# Patient Record
Sex: MALE | Race: WHITE | NOT HISPANIC OR LATINO | Employment: OTHER | URBAN - NONMETROPOLITAN AREA
[De-identification: names, ages, dates, MRNs, and addresses within clinical notes are randomized per-mention and may not be internally consistent; named-entity substitution may affect disease eponyms.]

---

## 2017-03-22 ENCOUNTER — OFFICE VISIT (OUTPATIENT)
Dept: OTOLARYNGOLOGY | Facility: CLINIC | Age: 27
End: 2017-03-22

## 2017-03-22 VITALS
BODY MASS INDEX: 27.22 KG/M2 | TEMPERATURE: 98.3 F | WEIGHT: 201 LBS | SYSTOLIC BLOOD PRESSURE: 128 MMHG | DIASTOLIC BLOOD PRESSURE: 82 MMHG | HEIGHT: 72 IN | HEART RATE: 70 BPM

## 2017-03-22 DIAGNOSIS — J35.3 ENLARGED TONSILS AND ADENOIDS: Primary | ICD-10-CM

## 2017-03-22 DIAGNOSIS — J03.00 ACUTE NON-RECURRENT STREPTOCOCCAL TONSILLITIS: ICD-10-CM

## 2017-03-22 PROCEDURE — 99203 OFFICE O/P NEW LOW 30 MIN: CPT | Performed by: NURSE PRACTITIONER

## 2017-03-22 RX ORDER — CETIRIZINE HYDROCHLORIDE 10 MG/1
10 TABLET ORAL DAILY
COMMUNITY

## 2017-03-22 NOTE — PROGRESS NOTES
Patient Care Team:  KAI Ambrocio as PCP - General (Nurse Practitioner)  Baltazar Caceres MD as Consulting Physician (Otolaryngology)    Chief Complaint   Patient presents with   • Sore Throat     Repeated Strep Throat       Subjective   History of Present Illness:  Jose Coley is a  26 y.o.  male who complains of a sore throat. The symptoms are localized to the bilateral tonsil. The patient has had moderate symptoms. The symptoms have been decreasing in amount for the last several months . The symptoms are aggravated by  no identifiable factors . The symptoms are improved by antibiotics and steroids. The patient states that in December 2016 he was diagnosed with strep throat and that it took 3 rounds of antibiotics to get rid of it. He has not had symptoms in the last month. Prior to this occurrence he states he had 1-2 episodes of tonsillitis per year.     Review of Systems:  Review of Systems   Constitutional: Negative for chills and fever.   HENT:        See HPI   Eyes: Negative.    Respiratory: Negative.    Cardiovascular: Negative.    Gastrointestinal: Negative.    Allergic/Immunologic: Positive for environmental allergies.   Neurological: Negative.    Hematological: Negative.    Psychiatric/Behavioral: Negative.        Past History:  Past Medical History:   Diagnosis Date   • Allergic rhinitis    • Chicken pox      Past Surgical History:   Procedure Laterality Date   • HERNIA REPAIR     • WISDOM TOOTH EXTRACTION       Family History   Problem Relation Age of Onset   • No Known Problems Mother    • Hypertension Father    • Diabetes Paternal Grandfather      Social History   Substance Use Topics   • Smoking status: Never Smoker   • Smokeless tobacco: Never Used   • Alcohol use No       Current Outpatient Prescriptions:   •  Ascorbic Acid (VITAMIN C PO), Take  by mouth., Disp: , Rfl:   •  cetirizine (zyrTEC) 10 MG tablet, Take 10 mg by mouth Daily., Disp: , Rfl:   Allergies:  Review of  patient's allergies indicates no known allergies.    Objective     Vital Signs:  Temp:  [98.3 °F (36.8 °C)] 98.3 °F (36.8 °C)  Heart Rate:  [70] 70  BP: (128)/(82) 128/82    Physical Exam:  CONSTITUTIONAL: well nourished, well-developed, alert, oriented, in no acute distress   COMMUNICATION AND VOICE: able to communicate normally for age, normal voice/cry quality  HEAD: normocephalic, no lesions, atraumatic, no tenderness, no masses   FACE: appearance normal, no lesions, no tenderness, no deformities, facial motion symmetric  SALIVARY GLANDS: parotid glands with no tenderness, no swelling, no masses, submandibular glands with normal size, nontender  EYES: ocular motility normal, eyelids normal, orbits normal, no proptosis, conjunctiva normal , pupils equal, round   EARS:  Hearing: response to conversational voice normal bilaterally   External Ears: auricles without lesions  Otoscopic: tympanic membrane appearance normal, no lesions, no perforation, normal mobility, no fluid  NOSE:  External Nose: structure normal, no tenderness on palpation, no nasal discharge, no lesions, no evidence of trauma, nostrils patent   Intranasal Exam: nasal mucosa normal, vestibule within normal limits, inferior turbinate normal, nasal septum midline   Nasopharynx: mirror exam deferred  ORAL:  Lips: upper and lower lips without lesion   Teeth: dentition within normal limits for age   Gums: gingivae healthy   Oral Mucosa: oral mucosa normal, no mucosal lesions   Floor of Mouth: Warthin’s duct patent, mucosa normal  Tongue: lingual mucosa normal without lesions, normal tongue mobility   Palate: soft and hard palates with normal mucosa and structure  Oropharynx: oropharynx mild erythema or uvula midline, tonsils 2+ size, cryptic bilaterally  HYPOPHARYNX: mirror exam deferred  LARYNX: mirror exam deferred   NECK: neck appearance normal, no masses or tenderness  THYROID: no overt thyromegaly, no tenderness, nodules or mass present on  palpation, position midline   LYMPH NODES: no lymphadenopathy  CHEST/RESPIRATORY: respiratory effort normal, normal chest excursion   CARDIOVASCULAR: extremities without cyanosis or edema   NEUROLOGIC/PSYCHIATRIC: oriented appropriately, mood normal, affect appropriate for age, CN II-XII intact grossly    Assessment   1. Enlarged tonsils and adenoids    2. Acute non-recurrent streptococcal tonsillitis        Plan   Medical and surgical options were discussed including tonsillectomy and adenoidectomy. Risks, benefits and alternatives were discussed and questions were answered. After considering the options, the patient decided to proceed with conservative management and observation. Due to the patient not meeting the indications for surgery we discussed continuing to monitor him and should this begin to reoccur we can further discuss indications for a tonsillectomy. 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 The patient was in agreement with this plan and will follow-up in 4 months with Dr. Caceres.      -----FOLLOW UP-----  Return in about 4 months (around 7/22/2017) for recheck tonsils in [place].      Stephanie Demarco, KAI  03/22/17  2:05 PM